# Patient Record
Sex: MALE | Race: WHITE
[De-identification: names, ages, dates, MRNs, and addresses within clinical notes are randomized per-mention and may not be internally consistent; named-entity substitution may affect disease eponyms.]

---

## 2018-09-05 ENCOUNTER — HOSPITAL ENCOUNTER (EMERGENCY)
Dept: HOSPITAL 91 - E/R | Age: 78
Discharge: HOME | End: 2018-09-05
Payer: MEDICARE

## 2018-09-05 ENCOUNTER — HOSPITAL ENCOUNTER (EMERGENCY)
Age: 78
Discharge: HOME | End: 2018-09-05

## 2018-09-05 DIAGNOSIS — E11.65: ICD-10-CM

## 2018-09-05 DIAGNOSIS — Z79.01: ICD-10-CM

## 2018-09-05 DIAGNOSIS — R47.81: ICD-10-CM

## 2018-09-05 DIAGNOSIS — D64.9: Primary | ICD-10-CM

## 2018-09-05 DIAGNOSIS — Z87.891: ICD-10-CM

## 2018-09-05 DIAGNOSIS — Z96.642: ICD-10-CM

## 2018-09-05 DIAGNOSIS — Z79.4: ICD-10-CM

## 2018-09-05 DIAGNOSIS — Z79.82: ICD-10-CM

## 2018-09-05 LAB
ADD MAN DIFF?: NO
ALANINE AMINOTRANSFERASE: 37 IU/L (ref 13–69)
ALBUMIN/GLOBULIN RATIO: 0.95
ALBUMIN: 3.8 G/DL (ref 3.3–4.9)
ALKALINE PHOSPHATASE: 81 IU/L (ref 42–121)
ANION GAP: 15 (ref 8–16)
ANISOCYTOSIS: (no result) (ref 0–0)
ASPARTATE AMINO TRANSFERASE: 32 IU/L (ref 15–46)
BASOPHIL #: 0 10^3/UL (ref 0–0.1)
BASOPHILS %: 0.3 % (ref 0–2)
BILIRUBIN,DIRECT: 0 MG/DL (ref 0–0.2)
BILIRUBIN,TOTAL: 0.3 MG/DL (ref 0.2–1.3)
BLOOD UREA NITROGEN: 20 MG/DL (ref 7–20)
CALCIUM: 9.2 MG/DL (ref 8.4–10.2)
CARBON DIOXIDE: 22 MMOL/L (ref 21–31)
CHLORIDE: 106 MMOL/L (ref 97–110)
CREATININE: 0.93 MG/DL (ref 0.61–1.24)
EOSINOPHILS #: 0.1 10^3/UL (ref 0–0.5)
EOSINOPHILS % (M): 1 % (ref 0–7)
EOSINOPHILS %: 0.9 % (ref 0–7)
GLOBULIN: 4 G/DL (ref 1.3–3.2)
GLUCOSE: 230 MG/DL (ref 70–220)
HEMATOCRIT: 35.7 % (ref 42–52)
HEMOGLOBIN: 12.1 G/DL (ref 14–18)
IMMATURE GRANS #M: 0.04 10^3/UL (ref 0–0.03)
IMMATURE GRANS % (M): 0.6 % (ref 0–0.43)
LIPASE: 66 U/L (ref 23–300)
LYMPHOCYTES #: 1.1 10^3/UL (ref 0.8–2.9)
LYMPHOCYTES #M: 1.3 10^3/UL (ref 0.8–2.9)
LYMPHOCYTES % (M): 19 % (ref 15–51)
LYMPHOCYTES %: 16.1 % (ref 15–51)
MACROCYTOSIS: (no result) (ref 0–0)
MEAN CORPUSCULAR HEMOGLOBIN: 34.4 PG (ref 29–33)
MEAN CORPUSCULAR HGB CONC: 33.9 G/DL (ref 32–37)
MEAN CORPUSCULAR VOLUME: 101.4 FL (ref 82–101)
MEAN PLATELET VOLUME: 11.1 FL (ref 7.4–10.4)
MONOCYTE #: 0.7 10^3/UL (ref 0.3–0.9)
MONOCYTE #M: 0.9 10^3/UL (ref 0.3–0.9)
MONOCYTES % (M): 13 % (ref 0–11)
MONOCYTES %: 10.6 % (ref 0–11)
NEUTROPHIL #: 5 10^3/UL (ref 1.6–7.5)
NEUTROPHILS %: 71.5 % (ref 39–77)
NUCLEATED RED BLOOD CELLS #: 0 10^3/UL (ref 0–0)
NUCLEATED RED BLOOD CELLS%: 0 /100WBC (ref 0–0)
PLATELET COUNT: 131 10^3/UL (ref 140–415)
PLATELET MORPHOLOGY COMMENT: (no result)
POIKILOCYTOSIS: (no result) (ref 0–0)
POSITIVE DIFF: (no result)
POTASSIUM: 4.6 MMOL/L (ref 3.5–5.1)
RED BLOOD COUNT: 3.52 10^6/UL (ref 4.7–6.1)
RED CELL DISTRIBUTION WIDTH: 12.1 % (ref 11.5–14.5)
SEGMENTED NEUTROPHILS (M) %: 67 % (ref 39–77)
SMUDGE%M: 27 % (ref 0–0)
SODIUM: 138 MMOL/L (ref 135–144)
TOTAL PROTEIN: 7.8 G/DL (ref 6.1–8.1)
TROPONIN-I: < 0.012 NG/ML (ref 0–0.12)
WHITE BLOOD COUNT: 7 10^3/UL (ref 4.8–10.8)

## 2018-09-05 PROCEDURE — 80053 COMPREHEN METABOLIC PANEL: CPT

## 2018-09-05 PROCEDURE — 83690 ASSAY OF LIPASE: CPT

## 2018-09-05 PROCEDURE — 70450 CT HEAD/BRAIN W/O DYE: CPT

## 2018-09-05 PROCEDURE — 93005 ELECTROCARDIOGRAM TRACING: CPT

## 2018-09-05 PROCEDURE — 36415 COLL VENOUS BLD VENIPUNCTURE: CPT

## 2018-09-05 PROCEDURE — 99285 EMERGENCY DEPT VISIT HI MDM: CPT

## 2018-09-05 PROCEDURE — 84484 ASSAY OF TROPONIN QUANT: CPT

## 2018-09-05 PROCEDURE — 85025 COMPLETE CBC W/AUTO DIFF WBC: CPT

## 2018-09-05 RX ADMIN — THIAMINE HYDROCHLORIDE 1 MLS/HR: 100 INJECTION, SOLUTION INTRAMUSCULAR; INTRAVENOUS at 15:48

## 2019-04-24 ENCOUNTER — HOSPITAL ENCOUNTER (OUTPATIENT)
Dept: HOSPITAL 10 - HKI | Age: 79
Discharge: HOME | End: 2019-04-24
Payer: MEDICARE

## 2019-04-24 ENCOUNTER — HOSPITAL ENCOUNTER (OUTPATIENT)
Dept: HOSPITAL 91 - HKI | Age: 79
Discharge: HOME | End: 2019-04-24
Payer: MEDICARE

## 2019-04-24 DIAGNOSIS — Z96.642: ICD-10-CM

## 2019-04-24 DIAGNOSIS — Z47.1: Primary | ICD-10-CM

## 2019-04-24 PROCEDURE — 73502 X-RAY EXAM HIP UNI 2-3 VIEWS: CPT

## 2019-04-24 PROCEDURE — G0463 HOSPITAL OUTPT CLINIC VISIT: HCPCS
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
## 2019-04-25 NOTE — RADRPT
PROCEDURE:   XR  Left Hip and pelvis. 

 

CLINICAL INDICATION:   Left hip pain. Pelvic pain. Postop. 

 

TECHNIQUE:   Three views.  Frontal pelvis.  Frontal and lateral left hip. 

 

COMPARISON:   No prior studies are available for comparison. 

 

FINDINGS:

There is no fracture or dislocation.

Vascular calcifications are present consistent with atherosclerosis.

There is a left hip total arthroplasty which appears satisfactory.

There are moderate degenerative changes of the right hip with joint space narrowing and osteophytes.

There is no lytic or blastic lesion.

The upper pelvis is not included on the image. 

 

IMPRESSION:

1.  Satisfactory postoperative appearance of the left hip.

2.  Moderate degenerative changes of the right hip.

3.  Atherosclerosis.

 

RPTAT: QQ

_____________________________________________ 

.Ge Sullivan MD, MD           Date    Time 

Electronically viewed and signed by .Ge Sullivan MD, MD on 04/25/2019 17:06 

 

D:  04/25/2019 17:06  T:  04/25/2019 17:06

.R/

## 2019-06-05 ENCOUNTER — HOSPITAL ENCOUNTER (OUTPATIENT)
Dept: HOSPITAL 10 - HKI | Age: 79
Discharge: HOME | End: 2019-06-05
Payer: MEDICARE

## 2019-06-05 ENCOUNTER — HOSPITAL ENCOUNTER (OUTPATIENT)
Dept: HOSPITAL 91 - HKI | Age: 79
Discharge: HOME | End: 2019-06-05
Payer: MEDICARE

## 2019-06-05 DIAGNOSIS — Z47.1: Primary | ICD-10-CM

## 2019-06-05 DIAGNOSIS — Z96.642: ICD-10-CM

## 2019-06-05 PROCEDURE — 73502 X-RAY EXAM HIP UNI 2-3 VIEWS: CPT

## 2019-06-05 PROCEDURE — G0463 HOSPITAL OUTPT CLINIC VISIT: HCPCS

## 2019-06-05 NOTE — CONS
Consult Date/Type/Reason


Admit Date/Time





Initial Consult Date





Date/Time of Note


DATE: 6/5/19 


TIME: 18:13





Subjective


78-year-old male following up today for further evaluation treatment for his 


left total hip arthroplasty and thigh pain.  He had a left total hip 


arthroplasty approximately 5 years ago at [facility].  At last visit work-


up for infection was begun secondary to concern of septic versus aseptic 


loosening of the total hip.  He also presented with significant GT and IT band 


tendinitis.  Of note the patient has been seen at the [facility] wound care center multiple times for repeated diabetic ulcers of his 


right foot.  He has been treated in the last 3 weeks for an ulcer.  The patient 


denies any fevers and chills.  States his pain is overall worse than last visit.


 Denies any groin pain.  However some days he ambulates well with a walker with 


minimal pain.





Objective


Exam


General: Awake, alert, in no acute distress, pleasant and cooperative


Heart: regular rhythm


Lungs: breathing comfortably, no tachypnea or dyspnea





Musculoskeletal:





Well developed male in no apparent distress but slightly to moderately 


disheveled. Gait demonstrates an


antalgic, and short stride  components and large short leg component. Standing, 


the pelvis is oblique and supine


there is a true leg length discrepancy, with the left leg 35 cm short.  There 


is tenderness over


The mid and distal IT band.


-----


Range of motion:


Flexion: 60


Extension: 0


Internal rotation: 10


External rotation: 20


Abduction: 20


Adduction: Midline


-----


Sitting there is no pelvic obliquity. Pain at the extremes of motion of the 


affected hip.


Skin was intact throughout both lower extremities. Sensation intact to light 


touch in a


sural, saphenous, deep peroneal, superficial peroneal, medial and lateral 


plantar nerve


distribution. Neurovascular exam showed 5/5 strength in the abductors, quads,


EHL/tibialis anterior/gastroc.  Difficult to palpate pulses bilaterally.  Feet 


are warm to touch.  Chronic vascular changes starting the distal tibia.





Results/Medications


Home Meds


Active Scripts


Famotidine* (Pepcid*) 20 Mg Tablet, 20 MG PO BID, #10 TAB


   Prov:NEERAJ CARRILLO DO         2/12/16


Diphenhydramine Hcl* (Benadryl*) 25 Mg Cap, 25 MG PO TID, #14 CAP


   Prov:NEERAJ CARRILLO DO         2/12/16


Reported Medications


Insulin Glargine* (Lantus*) 100 Unit/Ml Soln, 0 SC DAILY, #1 VIAL


   2/12/16


Trazodone Hcl* (Desyrel*) 150 Mg Tablet, 150 MG PO QHS, #30 TAB


   2/12/16


Finasteride* (Finasteride*) 5 Mg Tablet, 5 MG PO DAILY, TAB


   2/12/16


Clopidogrel Bisulfate (Clopidogrel) 75 Mg Tablet, 75 MG PO DAILY, #30 TAB


   2/12/16


Atorvastatin Calcium* (Atorvastatin Calcium*) 20 Mg Tablet, 20 MG PO QHS, #30 


TAB


   2/12/16


Carvedilol* (Carvedilol*) 6.25 Mg Tablet, 6.25 MG PO BID, #60 TAB


   2/12/16


Tamsulosin Hcl* (Tamsulosin Hcl*) 0.4 Mg Cap.er.24h, 0.4 MG PO DAILY, CAP


   2/12/16


Aspirin (Low Dose Aspirin) 81 Mg Tablet.dr, 81 MG PO DAILY, #30 TAB


   2/12/16


Benazepril Hcl* (Benazepril Hcl*) 20 Mg Tablet, 20 MG PO BID, #60 TAB


   2/12/16


Metformin Hcl* (Metformin Hcl*) 500 Mg Tablet, 500 MG PO WITH BREAKFAST, #30 TAB


   2/12/16


Folic Acid* (Folic Acid*) 1 Mg Tablet, 1 MG PO DAILY, TAB


   2/12/16


Insulin Regular, Human* (Novolin R*) 100 U/Ml Vial, 0 SC SLIDING SCALE AC, VIAL


   2/12/16


Imaging


Xrays obtained in clinic today and personally reviewed by myself:








AP pelvis and AP/Lat of the left hip demonstrate hip s/p JERED with hip reduced.  


The acetabular component has a little to no anteversion on the AP pelvis.  


Abduction angle is within acceptable limits.  There appears to be some sort of 


bone graft medially to the cup.  There remains a large medial wall osteophyte.  


The cup is lateralized.  There are possible signs of loosening of the cup.  The 


femoral stem appears to be undersized and subsided down to the level of the 


lesser trochanter.  Unclear if this is acute or how it was implanted at the time


of surgery.  There appears to be signs of loosening around the stem.  There is 


no clear bony ingrowth or on growth.  The left hip is significantly shorter than


the right hip.  There is decreased offset.  No signs of wear, osteolysis, or 


fracture.





No change from previous x-ray done in clinic.





Assessment/Plan


Hospital Course (Demo Recall)


78-year-old male with left JERED septic versus aseptic loosening and IT band 


tendinitis and greater trochanteric bursitis.  Infection work-up is concerning 


for possible infection.





Labs 4/24/18


WBC: 6.2 (3.8-10.8)


ESR: 43 (<20)


CRP: 0.3 (<8.0)





HgbA1c: 8.4





Given the elevated ESR infection work-up needs to be completed.  I recommended 


the patient to undergo aspiration of the left hip under fluoroscopy.  This will 


be sent for cell count, culture, alpha defensin.  If it is positive for 


infection I will need to discuss a two-stage revision with the patient.  If it 


is negative for infection the patient will need to be optimized prior to any 


revision.  This includes decreasing his HgbA1c <7.0 and having all his diabetic 


foot ulcers treated and resolved.  In the meantime he would begin physical 


therapy for the IT band tendinitis and GT bursitis





Plan for left hip aspiration.











PATRICIA RODRÍGUEZ MD                Jun 5, 2019 18:27

## 2019-06-06 NOTE — RADRPT
PROCEDURE:   XR pelvis and left hip

 

CLINICAL INDICATION:   Pain 

 

TECHNIQUE:   AP pelvis and AP and frog lateral views of the left hip were performed. 

 

COMPARISON:   None. 

 

FINDINGS:

There is a total left hip prosthesis in place without dislocation or loosening. No evidence of acute 
fractures. Severe degenerate joint disease of the right hip. Bones are osteopenic. No focal bony sukh
tic or lytic lesions. The soft tissues are unremarkable.

 

IMPRESSION:

 

1.  Unremarkable total left hip prosthesis.

2.  No acute fractures or malalignment.

3.  Severe degenerate joint disease of the right hip.

 

RPTAT:AAJJ

_____________________________________________ 

Physician Elvia           Date    Time 

Electronically viewed and signed by LEANDRO Gaitca Physician on 06/06/2019 03:09 

 

D:  06/06/2019 03:09  T:  06/06/2019 03:09

/

## 2019-09-12 ENCOUNTER — HOSPITAL ENCOUNTER (EMERGENCY)
Dept: HOSPITAL 91 - E/R | Age: 79
Discharge: HOME | End: 2019-09-12
Payer: MEDICARE

## 2019-09-12 DIAGNOSIS — Z96.642: ICD-10-CM

## 2019-09-12 DIAGNOSIS — E11.9: ICD-10-CM

## 2019-09-12 DIAGNOSIS — Z79.82: ICD-10-CM

## 2019-09-12 DIAGNOSIS — Z79.4: ICD-10-CM

## 2019-09-12 DIAGNOSIS — Z79.01: ICD-10-CM

## 2019-09-12 DIAGNOSIS — I11.0: ICD-10-CM

## 2019-09-12 DIAGNOSIS — R07.89: Primary | ICD-10-CM

## 2019-09-12 DIAGNOSIS — I50.9: ICD-10-CM

## 2019-09-12 PROCEDURE — 93005 ELECTROCARDIOGRAM TRACING: CPT

## 2019-09-12 PROCEDURE — 99284 EMERGENCY DEPT VISIT MOD MDM: CPT

## 2019-09-12 PROCEDURE — 96372 THER/PROPH/DIAG INJ SC/IM: CPT

## 2019-09-12 RX ADMIN — KETOROLAC TROMETHAMINE 1 MG: 15 INJECTION, SOLUTION INTRAMUSCULAR; INTRAVENOUS at 10:38
